# Patient Record
(demographics unavailable — no encounter records)

---

## 2025-06-10 NOTE — ASSESSMENT
[FreeTextEntry1] : 16-year-old boy with a questionable recurrent umbilical hernia.  I do not see any obvious signs of a recurrence but there is 1 area that I think we should just continue to watch.  As for the pilonidal disease I have talked to dad had a shave the area and the child will shower twice a day to keep the area clean.  They will come back and see me in about 2 to 3 weeks.  If it is healing and closing at then he may not need to have any surgery.  If the sinus is still open then I would recommend a minimally invasive pilonidal surgery.

## 2025-06-10 NOTE — PHYSICAL EXAM
[Regular heart rate and rhythm] : regular heart rate and rhythm [NL] : grossly intact [TextBox_37] : Umbilical hernia site is well-healed.  There is a small area that does not look like a recurrence and does not have any clear bulge but we should just observe this for now [TextBox_59] : 1 sizable pilonidal sinus with some granulation tissue.

## 2025-06-10 NOTE — CONSULT LETTER
[Dear  ___] : Dear  [unfilled], [FreeTextEntry3] : Jonnathan Hawkins MD  Director, Surgical Research  Division of Pediatric, General, Thoracic and Endoscopic Surgery  [facility]

## 2025-06-10 NOTE — CONSULT LETTER
[Dear  ___] : Dear  [unfilled], [FreeTextEntry3] : Jonnathan Hawkins MD  Director, Surgical Research  Division of Pediatric, General, Thoracic and Endoscopic Surgery  [facility]

## 2025-06-10 NOTE — REASON FOR VISIT
[Follow-up - Scheduled] : a follow-up, scheduled visit for [Patient] : patient [Parents] : parents [FreeTextEntry4] : Escobar Cortes MD

## 2025-06-10 NOTE — HISTORY OF PRESENT ILLNESS
[FreeTextEntry1] : Júnior is a16 year old male who is here today with concerns of hernia recurrence. He underwent repair of epigastric and umbilical hernia on 12/21/23.  He also has what sounds like pilonidal disease and they have asked me to see this as well.  He had a sinus that is draining small amount of blood over time.  As far as the umbilicus he has not had any pain for manage does not see any discrete masses.

## 2025-06-19 NOTE — ADDENDUM
[FreeTextEntry1] : Documented by Freedom Sin acting as a scribe for Dr. Hawkins on 06/19/2025.   All medical record entries made by the Scribe were at my, Dr. Hawkins, direction and personally dictated by me on 06/19/2025. I have reviewed the chart and agree that the record accurately reflects my personal performances of the history, physical exam, assessment and plan. I have also personally directed, reviewed, and agree with the instructions.

## 2025-06-19 NOTE — ASSESSMENT
[FreeTextEntry1] : Júnior is a 16 year old male with pilonidal disease. He is doing well, but I explained to Júnior and dad that his midline wound remains open and there are underlying hairs within the wound. Today, I shaved and cleaned the area and carefully pulled out several loose hairs. Júnior tolerated this well. I then educated Júnior and dad about pilonidal disease. I reviewed the options for long term treatment including surgical intervention with a minimal excision procedure vs expectant management with regular hair removal and proper hygiene. I reviewed the risks and benefits of each option and both him and dad are interested in the minimal excision technique. The risks discussed included but were not limited to bleeding, infection, injury to adjacent structures, and recurrent/persistent disease. Postoperative expectations were discussed. After our discussion, the family demonstrated their understanding and agreed to proceed. We will schedule this electively. They know to contact me sooner should he develop any new or concerning symptoms or should they have any other questions prior to the procedure.

## 2025-06-19 NOTE — CONSULT LETTER
[Dear  ___] : Dear  [unfilled], [Consult Letter:] : I had the pleasure of evaluating your patient, [unfilled]. [Please see my note below.] : Please see my note below. [Consult Closing:] : Thank you very much for allowing me to participate in the care of this patient.  If you have any questions, please do not hesitate to contact me. [Sincerely,] : Sincerely, [FreeTextEntry3] : Jonnathan Hawkins MD Director, Surgical Research Division of Pediatric, General, Thoracic and Endoscopic Surgery [facility]         [FreeTextEntry2] : Escobar Cortes MD

## 2025-06-19 NOTE — PHYSICAL EXAM
[NL] : grossly intact [Regular heart rate and rhythm] : regular heart rate and rhythm [TextBox_59] : One persistent sizable pilonidal sinus with underlying loose hairs removed from sinus

## 2025-06-19 NOTE — HISTORY OF PRESENT ILLNESS
[FreeTextEntry1] : Júnior is a 16 year old male here to follow up on his pilonidal disease. He has been doing well since his last visit without significant pain or discomfort from his midline. He has not developed any drainage, but dad notes the wound remains open. He has not had any fevers or changes in energy levels. The family is trying to keep the area clean and hair free at home.

## 2025-06-19 NOTE — CONSULT LETTER
[Dear  ___] : Dear  [unfilled], [Consult Letter:] : I had the pleasure of evaluating your patient, [unfilled]. [Please see my note below.] : Please see my note below. [Consult Closing:] : Thank you very much for allowing me to participate in the care of this patient.  If you have any questions, please do not hesitate to contact me. [Sincerely,] : Sincerely, [FreeTextEntry2] : Escobar Cortes MD [FreeTextEntry3] : Jonnathan Hawkins MD Director, Surgical Research Division of Pediatric, General, Thoracic and Endoscopic Surgery [facility]

## 2025-06-19 NOTE — ASSESSMENT
[FreeTextEntry1] : Júnoir is a 16 year old male with pilonidal disease. He is doing well, but I explained to Júnior and dad that his midline wound remains open and there are underlying hairs within the wound. Today, I shaved and cleaned the area and carefully pulled out several loose hairs. Júnior tolerated this well. I then educated Júnior and dad about pilonidal disease. I reviewed the options for long term treatment including surgical intervention with a minimal excision procedure vs expectant management with regular hair removal and proper hygiene. I reviewed the risks and benefits of each option and both him and dad are interested in the minimal excision technique. The risks discussed included but were not limited to bleeding, infection, injury to adjacent structures, and recurrent/persistent disease. Postoperative expectations were discussed. After our discussion, the family demonstrated their understanding and agreed to proceed. We will schedule this electively. They know to contact me sooner should he develop any new or concerning symptoms or should they have any other questions prior to the procedure.